# Patient Record
Sex: FEMALE | Race: WHITE | NOT HISPANIC OR LATINO
[De-identification: names, ages, dates, MRNs, and addresses within clinical notes are randomized per-mention and may not be internally consistent; named-entity substitution may affect disease eponyms.]

---

## 2021-06-02 ENCOUNTER — APPOINTMENT (OUTPATIENT)
Dept: PAIN MANAGEMENT | Facility: CLINIC | Age: 46
End: 2021-06-02
Payer: COMMERCIAL

## 2021-06-02 ENCOUNTER — NON-APPOINTMENT (OUTPATIENT)
Age: 46
End: 2021-06-02

## 2021-06-02 VITALS
WEIGHT: 139 LBS | BODY MASS INDEX: 21.82 KG/M2 | DIASTOLIC BLOOD PRESSURE: 60 MMHG | HEIGHT: 67 IN | SYSTOLIC BLOOD PRESSURE: 110 MMHG | TEMPERATURE: 98.2 F

## 2021-06-02 DIAGNOSIS — Z78.9 OTHER SPECIFIED HEALTH STATUS: ICD-10-CM

## 2021-06-02 DIAGNOSIS — G89.29 CERVICALGIA: ICD-10-CM

## 2021-06-02 DIAGNOSIS — J30.1 ALLERGIC RHINITIS DUE TO POLLEN: ICD-10-CM

## 2021-06-02 DIAGNOSIS — G89.29 PAIN IN RIGHT SHOULDER: ICD-10-CM

## 2021-06-02 DIAGNOSIS — M25.511 PAIN IN RIGHT SHOULDER: ICD-10-CM

## 2021-06-02 DIAGNOSIS — M54.2 CERVICALGIA: ICD-10-CM

## 2021-06-02 DIAGNOSIS — M54.9 DORSALGIA, UNSPECIFIED: ICD-10-CM

## 2021-06-02 PROBLEM — Z00.00 ENCOUNTER FOR PREVENTIVE HEALTH EXAMINATION: Status: ACTIVE | Noted: 2021-06-02

## 2021-06-02 PROCEDURE — 99203 OFFICE O/P NEW LOW 30 MIN: CPT

## 2021-06-02 PROCEDURE — 99072 ADDL SUPL MATRL&STAF TM PHE: CPT

## 2021-06-02 NOTE — HISTORY OF PRESENT ILLNESS
[___ yrs] : [unfilled] year(s) ago [Constant] : constant [4] : a maximum pain level of 4/10 [Dull] : dull [Aching] : aching [Lifting] : lifting [Heat] : heat [FreeTextEntry1] : 46 yof presents w/ neck and upper back pain. She also has a history of right shoulder pain which has resolved w/ PT previously. She does not take medications for pain and has not had any interventions.  [FreeTextEntry2] : 12 [FreeTextEntry6] : NICK 24% [FreeTextEntry7] : Back pain and left shoulder  [FreeTextEntry3] : exercising  [FreeTextEntry4] : massage

## 2021-06-02 NOTE — REVIEW OF SYSTEMS
[Back Pain] : back pain [Neck Pain] : neck pain [Joint Pain] : joint pain [Joint Stiffness] : joint stiffness [Headache] : headache [Anxiety] : anxiety [Negative] : Heme/Lymph

## 2021-06-02 NOTE — PHYSICAL EXAM

## 2021-06-02 NOTE — ASSESSMENT
[FreeTextEntry1] : 46 yof presents w/ neck and upper back pain. She also has a history of right shoulder pain which has resolved w/ PT previously. She does not take medications for pain and has not had any interventions. \par \par Physical therapy - increase ROM, strengthening, postural training, other modalities ad sybil\par \par If no relief w/ PT recommend imaging and consider intervention.\par \par NSAIDS prn.

## 2021-07-07 ENCOUNTER — APPOINTMENT (OUTPATIENT)
Dept: PAIN MANAGEMENT | Facility: CLINIC | Age: 46
End: 2021-07-07

## 2021-08-02 DIAGNOSIS — M25.559 PAIN IN UNSPECIFIED HIP: ICD-10-CM
